# Patient Record
Sex: FEMALE | Race: WHITE
[De-identification: names, ages, dates, MRNs, and addresses within clinical notes are randomized per-mention and may not be internally consistent; named-entity substitution may affect disease eponyms.]

---

## 2018-09-08 ENCOUNTER — HOSPITAL ENCOUNTER (EMERGENCY)
Dept: HOSPITAL 26 - MED | Age: 28
Discharge: LEFT BEFORE DISCHARGE | End: 2018-09-08
Payer: MEDICAID

## 2018-09-08 DIAGNOSIS — Z53.21: Primary | ICD-10-CM
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
## 2021-08-03 NOTE — NUR
DPatient discharged with v/s stable. Written and verbal after care instructions 
given and explained. 

Patient alert, oriented and verbalized understanding of instructions. 
Ambulatory with steady gait. All questions addressed prior to discharge. ID 
band removed. Patient advised to follow up with PMD. Rx of PROVENTIL HFA MDI, 
ZITHROMAX Z PACK, NAPROSYN given. Patient educated on indication of medication 
including possible reaction and side effects. Opportunity to ask questions 
provided and answered.